# Patient Record
Sex: FEMALE | Race: WHITE | NOT HISPANIC OR LATINO | URBAN - METROPOLITAN AREA
[De-identification: names, ages, dates, MRNs, and addresses within clinical notes are randomized per-mention and may not be internally consistent; named-entity substitution may affect disease eponyms.]

---

## 2019-02-18 ENCOUNTER — EMERGENCY (EMERGENCY)
Facility: HOSPITAL | Age: 74
LOS: 1 days | Discharge: HOME | End: 2019-02-18
Attending: EMERGENCY MEDICINE | Admitting: EMERGENCY MEDICINE
Payer: MEDICARE

## 2019-02-18 VITALS
WEIGHT: 186.07 LBS | DIASTOLIC BLOOD PRESSURE: 89 MMHG | HEART RATE: 72 BPM | SYSTOLIC BLOOD PRESSURE: 169 MMHG | OXYGEN SATURATION: 98 % | RESPIRATION RATE: 18 BRPM | TEMPERATURE: 98 F

## 2019-02-18 LAB
ALBUMIN SERPL ELPH-MCNC: 3.7 G/DL — SIGNIFICANT CHANGE UP (ref 3.3–5)
ALP SERPL-CCNC: 112 U/L — SIGNIFICANT CHANGE UP (ref 40–120)
ALT FLD-CCNC: 18 U/L — SIGNIFICANT CHANGE UP (ref 12–78)
ANION GAP SERPL CALC-SCNC: 9 MMOL/L — SIGNIFICANT CHANGE UP (ref 5–17)
APTT BLD: 36.4 SEC — HIGH (ref 27.5–36.3)
AST SERPL-CCNC: 15 U/L — SIGNIFICANT CHANGE UP (ref 15–37)
BASOPHILS # BLD AUTO: 0.04 K/UL — SIGNIFICANT CHANGE UP (ref 0–0.2)
BASOPHILS NFR BLD AUTO: 0.5 % — SIGNIFICANT CHANGE UP (ref 0–2)
BILIRUB SERPL-MCNC: 0.4 MG/DL — SIGNIFICANT CHANGE UP (ref 0.2–1.2)
BUN SERPL-MCNC: 17 MG/DL — SIGNIFICANT CHANGE UP (ref 7–23)
CALCIUM SERPL-MCNC: 8.7 MG/DL — SIGNIFICANT CHANGE UP (ref 8.5–10.1)
CHLORIDE SERPL-SCNC: 108 MMOL/L — SIGNIFICANT CHANGE UP (ref 96–108)
CO2 SERPL-SCNC: 25 MMOL/L — SIGNIFICANT CHANGE UP (ref 22–31)
CREAT SERPL-MCNC: 0.9 MG/DL — SIGNIFICANT CHANGE UP (ref 0.5–1.3)
EOSINOPHIL # BLD AUTO: 0.22 K/UL — SIGNIFICANT CHANGE UP (ref 0–0.5)
EOSINOPHIL NFR BLD AUTO: 2.5 % — SIGNIFICANT CHANGE UP (ref 0–6)
GLUCOSE SERPL-MCNC: 104 MG/DL — HIGH (ref 70–99)
HCT VFR BLD CALC: 41.5 % — SIGNIFICANT CHANGE UP (ref 34.5–45)
HGB BLD-MCNC: 13.5 G/DL — SIGNIFICANT CHANGE UP (ref 11.5–15.5)
IMM GRANULOCYTES NFR BLD AUTO: 0.2 % — SIGNIFICANT CHANGE UP (ref 0–1.5)
INR BLD: 1.07 RATIO — SIGNIFICANT CHANGE UP (ref 0.88–1.16)
LYMPHOCYTES # BLD AUTO: 0.96 K/UL — LOW (ref 1–3.3)
LYMPHOCYTES # BLD AUTO: 10.9 % — LOW (ref 13–44)
MCHC RBC-ENTMCNC: 30.2 PG — SIGNIFICANT CHANGE UP (ref 27–34)
MCHC RBC-ENTMCNC: 32.5 GM/DL — SIGNIFICANT CHANGE UP (ref 32–36)
MCV RBC AUTO: 92.8 FL — SIGNIFICANT CHANGE UP (ref 80–100)
MONOCYTES # BLD AUTO: 0.65 K/UL — SIGNIFICANT CHANGE UP (ref 0–0.9)
MONOCYTES NFR BLD AUTO: 7.4 % — SIGNIFICANT CHANGE UP (ref 2–14)
NEUTROPHILS # BLD AUTO: 6.93 K/UL — SIGNIFICANT CHANGE UP (ref 1.8–7.4)
NEUTROPHILS NFR BLD AUTO: 78.5 % — HIGH (ref 43–77)
NRBC # BLD: 0 /100 WBCS — SIGNIFICANT CHANGE UP (ref 0–0)
PLATELET # BLD AUTO: 228 K/UL — SIGNIFICANT CHANGE UP (ref 150–400)
POTASSIUM SERPL-MCNC: 3.8 MMOL/L — SIGNIFICANT CHANGE UP (ref 3.5–5.3)
POTASSIUM SERPL-SCNC: 3.8 MMOL/L — SIGNIFICANT CHANGE UP (ref 3.5–5.3)
PROT SERPL-MCNC: 7.6 G/DL — SIGNIFICANT CHANGE UP (ref 6–8.3)
PROTHROM AB SERPL-ACNC: 12.1 SEC — SIGNIFICANT CHANGE UP (ref 10–12.9)
RBC # BLD: 4.47 M/UL — SIGNIFICANT CHANGE UP (ref 3.8–5.2)
RBC # FLD: 12.7 % — SIGNIFICANT CHANGE UP (ref 10.3–14.5)
SODIUM SERPL-SCNC: 142 MMOL/L — SIGNIFICANT CHANGE UP (ref 135–145)
TROPONIN I SERPL-MCNC: <.015 NG/ML — SIGNIFICANT CHANGE UP (ref 0.01–0.04)
WBC # BLD: 8.82 K/UL — SIGNIFICANT CHANGE UP (ref 3.8–10.5)
WBC # FLD AUTO: 8.82 K/UL — SIGNIFICANT CHANGE UP (ref 3.8–10.5)

## 2019-02-18 PROCEDURE — 71046 X-RAY EXAM CHEST 2 VIEWS: CPT | Mod: 26

## 2019-02-18 PROCEDURE — 70498 CT ANGIOGRAPHY NECK: CPT

## 2019-02-18 PROCEDURE — 85730 THROMBOPLASTIN TIME PARTIAL: CPT

## 2019-02-18 PROCEDURE — 85610 PROTHROMBIN TIME: CPT

## 2019-02-18 PROCEDURE — 70496 CT ANGIOGRAPHY HEAD: CPT | Mod: 26

## 2019-02-18 PROCEDURE — 93005 ELECTROCARDIOGRAM TRACING: CPT

## 2019-02-18 PROCEDURE — 80053 COMPREHEN METABOLIC PANEL: CPT

## 2019-02-18 PROCEDURE — 71046 X-RAY EXAM CHEST 2 VIEWS: CPT

## 2019-02-18 PROCEDURE — 99284 EMERGENCY DEPT VISIT MOD MDM: CPT | Mod: 25

## 2019-02-18 PROCEDURE — 36415 COLL VENOUS BLD VENIPUNCTURE: CPT

## 2019-02-18 PROCEDURE — 84484 ASSAY OF TROPONIN QUANT: CPT

## 2019-02-18 PROCEDURE — 70498 CT ANGIOGRAPHY NECK: CPT | Mod: 26

## 2019-02-18 PROCEDURE — 93010 ELECTROCARDIOGRAM REPORT: CPT

## 2019-02-18 PROCEDURE — 85027 COMPLETE CBC AUTOMATED: CPT

## 2019-02-18 PROCEDURE — 70496 CT ANGIOGRAPHY HEAD: CPT

## 2019-02-18 RX ORDER — LEVOTHYROXINE SODIUM 125 MCG
0 TABLET ORAL
Qty: 0 | Refills: 0 | COMMUNITY

## 2019-02-18 RX ORDER — ACEBUTOLOL HCL 200 MG
0 CAPSULE ORAL
Qty: 0 | Refills: 0 | COMMUNITY

## 2019-02-18 RX ORDER — MECLIZINE HCL 12.5 MG
25 TABLET ORAL ONCE
Qty: 0 | Refills: 0 | Status: COMPLETED | OUTPATIENT
Start: 2019-02-18 | End: 2019-02-18

## 2019-02-18 RX ORDER — MECLIZINE HCL 12.5 MG
1 TABLET ORAL
Qty: 9 | Refills: 0 | OUTPATIENT
Start: 2019-02-18 | End: 2019-02-20

## 2019-02-18 RX ORDER — SODIUM CHLORIDE 9 MG/ML
1000 INJECTION INTRAMUSCULAR; INTRAVENOUS; SUBCUTANEOUS ONCE
Qty: 0 | Refills: 0 | Status: COMPLETED | OUTPATIENT
Start: 2019-02-18 | End: 2019-02-18

## 2019-02-18 RX ADMIN — SODIUM CHLORIDE 500 MILLILITER(S): 9 INJECTION INTRAMUSCULAR; INTRAVENOUS; SUBCUTANEOUS at 08:44

## 2019-02-18 RX ADMIN — Medication 25 MILLIGRAM(S): at 08:43

## 2019-02-18 NOTE — ED PROVIDER NOTE - PROGRESS NOTE DETAILS
pt stable await ct, doing well, aware of prelim labs, reports she did have surgery for basal cell on chest wall remotely, hx of d and c remotely

## 2019-02-18 NOTE — ED PROVIDER NOTE - CHPI ED SYMPTOMS NEG
no vomiting/no change in level of consciousness/no chills/no fever/no loss of consciousness/no nausea/no numbness/no syncope/no weakness

## 2019-02-18 NOTE — ED PROVIDER NOTE - CARE PROVIDER_API CALL
Donn Zamarripa)  Internal Medicine; Rheumatology  [address] [place] [address]  Phone: [phone]  Fax: [phone]  Follow Up Time:     Hakeem Mccormack)  Neurology  [address] [place] [address]  Phone: [phone]  Fax: [phone]  Follow Up Time:

## 2019-02-18 NOTE — ED PROVIDER NOTE - RADIOLOGY FINDINGS
reviewed by me/CXR no acute abnormality reviewed by me/CT head - no acute findings/CXR no acute abnormality

## 2019-02-18 NOTE — ED PROVIDER NOTE - OBJECTIVE STATEMENT
pt is a 72 yo f whose pmd is dr calvin lee.  she reports feeling congested with sinus pressure on r side for 2 weeks associated with nasal stuffiness and intermittent blood when blowing nose. she started nasal saline and saw her pmd 7 days ago.  he adv pt to continue symptomatic therapies including starting a humidifier.  she stopped using the nasal saline. pmd ordered carotid dopplers but she is not scheduled for them until next week. due to insurance.  this am at 1 am she reports that she was watching tv and became very dizzy and had trouble focusing.  the dizziness was better if she closed her eyes.  She went to bed and on awakening this am she has improved sx but still has r maxillary sinus pressure a fullness in r ear and endorses that for a moment during her episodes of dizziness felt pins and needles on the left side of her face, arm and leg. x 1 episode for just a moment.  she has remote hx of vertigo 6 years ago resolved no further episodes.  no trauma no fever no chills no cp no sob bib  for eval  never smoker not a drinker intolerant of cipro causeing back pain. hx of palpitations and low thyroid

## 2019-02-18 NOTE — ED PROVIDER NOTE - NSFOLLOWUPINSTRUCTIONS_ED_ALL_ED_FT
follow up with dr Dallin RIVAS FOR DIZZYNESS EVERY 8 HOURS WITH CAUTION WILL MAKE YOU SLEEPY  ZOFRAN (IF PRESCRIBED) FOR NAUSEA AS NEEDED EVERY 8 HOURS  STAY WELL HYDRATED  FOLLOW UP WITH NEUROLOGY  FOLLOW UP WITH YOUR DOCTOR OR THE CLINIC   NO ALCOHOL OR SEDATIVES  DO NOT DRIVE IF YOU ARE DIZZY OR SYMPTOMATIC  RETURN FOR WORSENING SYMPTOMS OR ANY CONCERN

## 2019-02-18 NOTE — ED PROVIDER NOTE - ENMT, MLM
Airway patent, Nasal mucosa clear. Mouth with normal mucosa. Throat has no vesicles, no oropharyngeal exudates and uvula is midline. nares:r nares dried mucosa with evidence of prior nose bleed no active bleed no sinus pain to percussion, pt has dull r ear no effusion no erythema cw prior infections, l tm normal neck is supple no jordan

## 2019-10-14 NOTE — ED ADULT NURSE NOTE - NS ED NURSE LEVEL OF CONSCIOUSNESS SPEECH
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 Dr. Melanie Murillo filled out and signed form. Form faxed back to [phone].
Speaking Coherently

## 2020-07-16 NOTE — ED PROVIDER NOTE - NS_EDPROVIDERDISPOUSERTYPE_ED_A_ED
Vaccine Information Statement(s) was given today. This has been reviewed, questions answered, and verbal consent given by Patient for injection(s) and administration of Shingles.        Patient tolerated without incident. See immunization grid for documentation.         Attending Attestation (For Attendings USE Only)...

## 2022-11-01 NOTE — ED ADULT NURSE NOTE - GASTROINTESTINAL WDL
Abdomen soft, nontender, nondistended, bowel sounds present in all 4 quadrants. Azathioprine Pregnancy And Lactation Text: This medication is Pregnancy Category D and isn't considered safe during pregnancy. It is unknown if this medication is excreted in breast milk.

## 2024-07-28 ENCOUNTER — EMERGENCY (EMERGENCY)
Facility: HOSPITAL | Age: 79
LOS: 1 days | Discharge: HOME | End: 2024-07-28
Attending: EMERGENCY MEDICINE
Payer: MEDICARE

## 2024-07-28 VITALS
HEART RATE: 74 BPM | WEIGHT: 175.93 LBS | OXYGEN SATURATION: 97 % | SYSTOLIC BLOOD PRESSURE: 157 MMHG | RESPIRATION RATE: 20 BRPM | HEIGHT: 64 IN | DIASTOLIC BLOOD PRESSURE: 87 MMHG | TEMPERATURE: 98 F

## 2024-07-28 LAB
ALBUMIN SERPL ELPH-MCNC: 4.2 G/DL — SIGNIFICANT CHANGE UP (ref 3.3–5)
ALP SERPL-CCNC: 104 U/L — SIGNIFICANT CHANGE UP (ref 40–120)
ALT FLD-CCNC: 15 U/L — SIGNIFICANT CHANGE UP (ref 10–45)
ANION GAP SERPL CALC-SCNC: 15 MMOL/L — SIGNIFICANT CHANGE UP (ref 5–17)
APTT BLD: 30.8 SEC — SIGNIFICANT CHANGE UP (ref 24.5–35.6)
AST SERPL-CCNC: 24 U/L — SIGNIFICANT CHANGE UP (ref 10–40)
BASOPHILS # BLD AUTO: 0.03 K/UL — SIGNIFICANT CHANGE UP (ref 0–0.2)
BASOPHILS NFR BLD AUTO: 0.4 % — SIGNIFICANT CHANGE UP (ref 0–2)
BILIRUB SERPL-MCNC: 0.5 MG/DL — SIGNIFICANT CHANGE UP (ref 0.2–1.2)
BUN SERPL-MCNC: 12 MG/DL — SIGNIFICANT CHANGE UP (ref 7–23)
CALCIUM SERPL-MCNC: 10 MG/DL — SIGNIFICANT CHANGE UP (ref 8.4–10.5)
CHLORIDE SERPL-SCNC: 99 MMOL/L — SIGNIFICANT CHANGE UP (ref 96–108)
CO2 SERPL-SCNC: 21 MMOL/L — LOW (ref 22–31)
CREAT SERPL-MCNC: 0.88 MG/DL — SIGNIFICANT CHANGE UP (ref 0.5–1.3)
EGFR: 66 ML/MIN/1.73M2 — SIGNIFICANT CHANGE UP
EOSINOPHIL # BLD AUTO: 0.19 K/UL — SIGNIFICANT CHANGE UP (ref 0–0.5)
EOSINOPHIL NFR BLD AUTO: 2.3 % — SIGNIFICANT CHANGE UP (ref 0–6)
GLUCOSE SERPL-MCNC: 108 MG/DL — HIGH (ref 70–99)
HCT VFR BLD CALC: 37.8 % — SIGNIFICANT CHANGE UP (ref 34.5–45)
HGB BLD-MCNC: 12.5 G/DL — SIGNIFICANT CHANGE UP (ref 11.5–15.5)
IMM GRANULOCYTES NFR BLD AUTO: 0.1 % — SIGNIFICANT CHANGE UP (ref 0–0.9)
INR BLD: 1.09 RATIO — SIGNIFICANT CHANGE UP (ref 0.85–1.18)
LIDOCAIN IGE QN: 32 U/L — SIGNIFICANT CHANGE UP (ref 7–60)
LYMPHOCYTES # BLD AUTO: 1.28 K/UL — SIGNIFICANT CHANGE UP (ref 1–3.3)
LYMPHOCYTES # BLD AUTO: 15.5 % — SIGNIFICANT CHANGE UP (ref 13–44)
MCHC RBC-ENTMCNC: 31.1 PG — SIGNIFICANT CHANGE UP (ref 27–34)
MCHC RBC-ENTMCNC: 33.1 GM/DL — SIGNIFICANT CHANGE UP (ref 32–36)
MCV RBC AUTO: 94 FL — SIGNIFICANT CHANGE UP (ref 80–100)
MONOCYTES # BLD AUTO: 0.79 K/UL — SIGNIFICANT CHANGE UP (ref 0–0.9)
MONOCYTES NFR BLD AUTO: 9.6 % — SIGNIFICANT CHANGE UP (ref 2–14)
NEUTROPHILS # BLD AUTO: 5.96 K/UL — SIGNIFICANT CHANGE UP (ref 1.8–7.4)
NEUTROPHILS NFR BLD AUTO: 72.1 % — SIGNIFICANT CHANGE UP (ref 43–77)
NRBC # BLD: 0 /100 WBCS — SIGNIFICANT CHANGE UP (ref 0–0)
PLATELET # BLD AUTO: 193 K/UL — SIGNIFICANT CHANGE UP (ref 150–400)
POTASSIUM SERPL-MCNC: 3.8 MMOL/L — SIGNIFICANT CHANGE UP (ref 3.5–5.3)
POTASSIUM SERPL-SCNC: 3.8 MMOL/L — SIGNIFICANT CHANGE UP (ref 3.5–5.3)
PROT SERPL-MCNC: 7.4 G/DL — SIGNIFICANT CHANGE UP (ref 6–8.3)
PROTHROM AB SERPL-ACNC: 11.4 SEC — SIGNIFICANT CHANGE UP (ref 9.5–13)
RBC # BLD: 4.02 M/UL — SIGNIFICANT CHANGE UP (ref 3.8–5.2)
RBC # FLD: 12.4 % — SIGNIFICANT CHANGE UP (ref 10.3–14.5)
SODIUM SERPL-SCNC: 135 MMOL/L — SIGNIFICANT CHANGE UP (ref 135–145)
WBC # BLD: 8.26 K/UL — SIGNIFICANT CHANGE UP (ref 3.8–10.5)
WBC # FLD AUTO: 8.26 K/UL — SIGNIFICANT CHANGE UP (ref 3.8–10.5)

## 2024-07-28 PROCEDURE — 99285 EMERGENCY DEPT VISIT HI MDM: CPT

## 2024-07-28 PROCEDURE — 82962 GLUCOSE BLOOD TEST: CPT

## 2024-07-28 PROCEDURE — 85730 THROMBOPLASTIN TIME PARTIAL: CPT

## 2024-07-28 PROCEDURE — 71046 X-RAY EXAM CHEST 2 VIEWS: CPT | Mod: 26

## 2024-07-28 PROCEDURE — 99285 EMERGENCY DEPT VISIT HI MDM: CPT | Mod: 25

## 2024-07-28 PROCEDURE — 85610 PROTHROMBIN TIME: CPT

## 2024-07-28 PROCEDURE — 85025 COMPLETE CBC W/AUTO DIFF WBC: CPT

## 2024-07-28 PROCEDURE — 83690 ASSAY OF LIPASE: CPT

## 2024-07-28 PROCEDURE — 93005 ELECTROCARDIOGRAM TRACING: CPT

## 2024-07-28 PROCEDURE — 80053 COMPREHEN METABOLIC PANEL: CPT

## 2024-07-28 PROCEDURE — 71046 X-RAY EXAM CHEST 2 VIEWS: CPT

## 2024-07-28 RX ORDER — LIDOCAINE HCL 28 MG/G
1 GEL TOPICAL ONCE
Refills: 0 | Status: COMPLETED | OUTPATIENT
Start: 2024-07-28 | End: 2024-07-28

## 2024-07-28 RX ADMIN — LIDOCAINE HCL 1 PATCH: 28 GEL TOPICAL at 22:03

## 2024-07-28 NOTE — ED ADULT NURSE NOTE - NSFALLRISKINTERV_ED_ALL_ED

## 2024-07-28 NOTE — ED PROVIDER NOTE - PHYSICAL EXAMINATION
GENERAL: well appearing in no acute distress  HEAD: normocephalic, atraumatic  HEENT: normal conjunctiva, oral mucosa moist  CARDIAC: regular rate and rhythm, no appreciable murmurs,  PULM: normal breath sounds, clear to ascultation bilaterally, no rales, rhonchi, wheezing  GI: abdomen nondistended, soft, nontender, no guarding, rebound tenderness  NEURO: no focal motor or sensory deficits, CN2-12 intact, normal speech, PERRLA, EOMI,  AAOx3  MSK: midline ttp in the mid thoracic spine, no midline cervical ttp, right posterior chest wall tenderness, no extremity tenderness, ROM of UE and LE intact b/l   SKIN: well-perfused, extremities warm, no visible rashes  PSYCH: appropriate mood and affect

## 2024-07-28 NOTE — ED ADULT TRIAGE NOTE - CHIEF COMPLAINT QUOTE
mechanical fall yesterday was seen at  who dx with rib fx, sent to [facility] that night who did CT and shown to have traumatic pneumothorax   denies any SOB

## 2024-07-28 NOTE — ED ADULT NURSE REASSESSMENT NOTE - NS ED NURSE REASSESS COMMENT FT1
pt educated regarding use of incentive spirometer. Pt able to demonstrate understanding via teachback.

## 2024-07-28 NOTE — ED PROVIDER NOTE - CARE PLAN
1 Principal Discharge DX:	Fracture of single rib  Secondary Diagnosis:	Fracture of thoracic transverse process

## 2024-07-28 NOTE — ED PROVIDER NOTE - NSFOLLOWUPINSTRUCTIONS_ED_ALL_ED_FT
you were seen Emergency room for a rib fracture of your right posterior seventh rib, T7 transverse process fracture, and a very tiny pneumothorax.  Upon repeat chest x-ray performed at our institution, there is no evidence of pneumothorax.  We had trauma evaluate you and they said you are okay for discharge home with outpatient follow-up.    Please follow-up with your primary care physician this week.  Please also see a spine specialist for your back.    Please return to the emergency room if you experience any new or worsening symptoms.  We sent a prescription for lidocaine patches to your pharmacy.  Please use these as directed.    For pain you may take    -- Please use 1,000mg Tylenol (also called acetaminophen) every 6 hours & 400mg Motrin (also called Advil or ibuprofen) every 6 hours as needed for pain/discomfort/swelling. You can get these without a prescription. Don't use more than 3500mg of Tylenol in any 24-hour period. Make sure your other prescription/over-the-counter medications don't contain any Tylenol so you don't take too much. If you have any stomach discomfort while taking Motrin, you can use TUMS or Pepcid or Zantac (these can all be bought without a prescription).      Use the incentive spirometer multiple times a day to keep your lungs open and aerated      If not improved in 2 weeks, follow with either:     [facility] (comprehensive, with Neurology, Orthopedic Surgery, Neurosurgery, and Physical Therapy): [phone].

## 2024-07-28 NOTE — ED PROVIDER NOTE - PROGRESS NOTE DETAILS
Seen by trauma who signed off. Dispo pending re-eval, po challenge. Received CT report from [facility] via fax. Shows Mildly displaced fracture through the right posterior seventh rib.  Probably nondisplaced fracture through the right transverse process of T7.  Tiny right apical pneumothorax.  Trace right pleural effusion.  Few scattered pulmonary micronodules measuring up to 0.2 cm no routine follow-up imaging is recommended. Michelle Castellanos MD, PGY3: Patient reassessed at bedside, reports improvement in symptoms at this time.  pulling 1750 on IS. All findings on labs and imaging communicated with patient, all questions answered at this time.  Patient given strict return precautions and directions to follow-up with their primary care physician and spine within the next week.  Patient verbalized understanding and agrees with plan.  Patient ready for discharge.

## 2024-07-28 NOTE — ED PROVIDER NOTE - ATTENDING CONTRIBUTION TO CARE
Attending MD Kennedy:  I have seen and examined this patient and fully participated in the care of this patient as the teaching attending. I personally made/approved the management plan and take responsibility for the patient management.      79-year-old woman is presenting for evaluation of right-sided back and chest wall pain in the setting of reportedly diagnosed rib fractures on the right side with possible pneumothorax.  The patient states that last night she tripped and fell onto her right side, she did not hit her head or lose consciousness.  She was seen at urgent care and had a chest x-ray that raise concern for rib fracture, sent to [facility] emergency department and had a CTA scan of the chest that they report had a traumatic pneumothorax and transverse process fracture of a thoracic vertebrae, patient's disc was sent with her however there is no report from that hospital.  Patient was recommended to be sent to [facility] for observation and trauma evaluation however she preferred to come to this hospital for second opinion.  She has not taken any pain medication since earlier today.  She has no shortness of breath.  Does not take any anticoagulants.    Patient's vital signs are nonactionable oxygen saturation is 96 to 97% in room air, she is breathing comfortably on room air.  Breath sounds are full and equal in the bilateral anterior lung fields.  There is very mild tenderness to palpation in the right posterolateral chest wall without crepitus.  The abdomen is soft nondistended nontender.  The cervical spine is nontender.  There is no midline thoracic tenderness to palpation.  Moves all extremities spontaneously. Symmetric  strength bilateral upper extremities, elbow flexion 5/5 bilaterally, elbow extension 5/5 bilaterally, patient can straight leg raise bilaterally and resist symmetrically. Symmetric smile, EOMI b/l extremities warm well-perfused pulses palpable.    Patient is presenting for evaluation of fall from standing yesterday with reported chest wall injury right-sided rib fracture and reported pneumothorax.  Patient fortunately at this time is clinically well-appearing and in no respiratory distress.  Will attempt to upload CT images from outside hospital chest CT, in the interim will obtain repeat chest x-ray to ensure no very large pneumothorax, placed on continuous pulse oximetry, obtain screening labs incentive spirometry and anticipate trauma surgery consultation once imaging obtained      *The above represents an initial assessment/impression. Please refer to progress notes for potential changes in patient clinical course*

## 2024-07-28 NOTE — ED PROVIDER NOTE - OTHER FINDINGS
ECG recorded at 9:51 PM independently interpreted by me , Dr Jared Kennedy,  at 11:58 PM shows normal sinus rhythm left axis deviation normal intervals no acute diagnostic ischemic ST-T changes

## 2024-07-28 NOTE — ED ADULT NURSE NOTE - CHIEF COMPLAINT QUOTE
mechanical fall yesterday was seen at  who dx with rib fx, sent to [facility] that night who did CT and shown to have traumatic pneumothorax   denies any SOB

## 2024-07-28 NOTE — ED ADULT NURSE NOTE - OBJECTIVE STATEMENT
PT is an 79 y.o female co rib injury. Pt is A&Ox4 accompanied by son at bedside. PT presents s/p mechanical fall last night while fixing her radio. Pt was seen at  earlier today where she was told she had RT sided rib fx and advised to go to nearby hospital for CT scan. At OSH pt was told she had a "traumatic  pneumothorax" but pt wanted to come to [facility] for second opinion. On exam, pt spontaneously breathing on RA>95 %, CM NSR, RT chest TTP, skin dry and intact, peripheral pulses present, PERRLA. Pt denies any SOB, head strike, dizziness, weakness, fever, N/V/D, chills, change in vision, or dysuria. Safety and comfort measures initiated- bed placed in lowest position and side rails raised. Pt oriented to call bell system.

## 2024-07-28 NOTE — ED ADULT NURSE REASSESSMENT NOTE - NS ED NURSE REASSESS COMMENT FT1
[facility] room tele tech contacted regarding continuous pulse ox order. RN to be notified if SpO2 <90%.

## 2024-07-28 NOTE — ED PROVIDER NOTE - OBJECTIVE STATEMENT
79-year-old female history of heart palpitations, hypertension and hypothyroidism presents for evaluation of a right posterior seventh rib fracture, right transverse process fracture of T7, and tiny right apical pneumothorax with a trace right pleural effusion found on CAT scan at an outside hospital after patient had trip and fall at home.  Patient states she was bending over to plug in her portable radio when she lost her balance and toppled onto her right side.  Patient denies hitting her head or using AC.  Patient denies LOC.  Patient had gone to an urgent care where x-ray at that time showed concerning findings and she was transferred over to [facility] where CT showed the above injuries.  Patient at this time is denying any chest pain difficulty breathing headache vision changes abdominal pain nausea vomiting pain in her extremities.  Patient states her pain is controlled at this time.

## 2024-07-28 NOTE — ED PROVIDER NOTE - PATIENT PORTAL LINK FT
You can access the FollowMyHealth Patient Portal offered by [facility] by registering at the following website: [email or web address]. By joining [facility]’s FollowMyHealth portal, you will also be able to view your health information using other applications (apps) compatible with our system.

## 2024-07-28 NOTE — ED PROVIDER NOTE - CLINICAL SUMMARY MEDICAL DECISION MAKING FREE TEXT BOX
79-year-old female presenting for trauma evaluation of multiple traumatic injuries after mechanical fall, reviewed outside Hospital CAT scan report, patient has mildly displaced fracture through the right posterior seventh rib probable a nondisplaced fracture through the right transverse process fracture of T7.  Tiny right apical pneumothorax trace right pleural effusion.  Patient otherwise is well-appearing at this time with no hemodynamic abnormalities that would require intervention at this time, pulling 750 on incentive spirometer.  Pain is well-controlled.    Plan for chest x-ray, labs and trauma consult.  Dispo pending findings.

## 2024-07-28 NOTE — ED PROVIDER NOTE - CARE PROVIDER_API CALL
Mitzi Ortiz Usman  Neurosurgery  [address], [address]  [place] [address]  Phone: [phone]  Fax: [phone]  Follow Up Time: 4-6 Days

## 2024-07-29 VITALS
RESPIRATION RATE: 18 BRPM | OXYGEN SATURATION: 100 % | SYSTOLIC BLOOD PRESSURE: 135 MMHG | TEMPERATURE: 98 F | DIASTOLIC BLOOD PRESSURE: 81 MMHG | HEART RATE: 74 BPM

## 2024-07-29 RX ORDER — LIDOCAINE HCL 28 MG/G
1 GEL TOPICAL
Qty: 4 | Refills: 0
Start: 2024-07-29 | End: 2024-08-07

## 2024-07-29 NOTE — CONSULT NOTE ADULT - SUBJECTIVE AND OBJECTIVE BOX
TRAUMA SURGERY CONSULT NOTE  --------------------------------------------------------------------------------------------    TRAUMA ACTIVATION LEVEL: Consult    MECHANISM OF INJURY:      [x] Blunt  	[] MVC	[x] Fall	[] Pedestrian Struck	[] Motorcycle accident      [] Penetrating  	[] Gun Shot Wound 		[] Stab Wound    GCS: 15 	E: 4	V: 5	M: 6    HPI:   78yo very pleasant F with Hx HTN, hypothyroidism, and arrythmia who presents after mechanical fall earlier this evening with (-) HS / (-) LOC. Patient states that she bent over to plug in her portable radio when she tripped and fell over. Patient denies syncope prior to fall. She was seen at urgent care with CXR c/f rib Fx, for which she was sent to [facility] ED. She was found to have a traumatic apical PTX and a transverse process Fx and was transferred to [facility] for Trauma evaluation. On presentation to [facility], primary survey was intact with GCS 15. Secondary survey significant for point tenderness to R posterior chest wall. CXR at [facility] negative for PTX. Patient pulling 1500 on ISS.     Primary Survey:  A - airway intact  B - bilateral breath sounds and good chest rise  C - initial BP  BP: 152/81 (07-28-24 @ 21:40), HR HR: 73 (07-28-24 @ 21:40), palpable pulses in all extremities  D - GCS 15 on arrival  Exposure obtained    Secondary Survey:  GEN: resting comfortably in bed, in NAD  HEENT: normocephalic, non-tender to palpation, no abrasions visible, no step-offs palpated  NECK: trachea midline, non-tender to palpation at posterior midline, no pain with flexion, extension, and bilateral neck rotation  CHEST: non-tender to palpation across clavicles and b/l anterior ribs  BACK: non-tender to palpation along cervical, thoracic, lumbar spine midline, tender to palpation R posterior chest wall, no palpable step-offs or hematomas  ABD: soft, non-distended, non-tender   PELVIS: no pelvic instability  RECTUM: good rectal tone  LUE: no visible abrasions or deformities, non-tender to palpation across upper and lower arm, 5/5  strength, fingers warm, well-perfused, full ROM in shoulder, elbow, wrist, and fingers, palpable radial + ulnar pulses  RUE: no visible abrasions or deformities, non-tender to palpation across upper and lower arm, 5/5  strength, fingers warm, well-perfused, full ROM in shoulder, elbow, wrist, and fingers, palpable radial + ulnar pulses  LLE: no visible abrasions or deformities, non-tender to palpation across upper and lower leg; full ROM in hip, knee, ankle, and toes, 5/5 dorsiflexion + plantarflexion, palpable DP + PT pulses; warm, well-perfused  RLE: no visible abrasions or deformities, non-tender to palpation across upper and lower leg; full ROM in hip, knee, ankle, and toes, 5/5 dorsiflexion + plantarflexion, palpable DP + PT pulses; warm, well-perfused  NEURO: AAOx4, no focal neuro deficits; CN II-IX intact; pupils 3mm, equal and reactive to light bilaterally    ROS: 10-system review is otherwise negative except HPI above.      ALLERGIES:  Cipro (Other)      HOME MEDICATIONS:      CURRENT MEDICATIONS  MEDICATIONS (STANDING):   MEDICATIONS (PRN):  --------------------------------------------------------------------------------------------    Vitals:  T(C): 36.5 (07-28-24 @ 21:40), Max: 36.6 (07-28-24 @ 21:11)  HR: 73 (07-28-24 @ 21:40) (73 - 74)  BP: 152/81 (07-28-24 @ 21:40) (152/81 - 157/87)  RR: 18 (07-28-24 @ 21:40) (18 - 20)  SpO2: 96% (07-28-24 @ 21:40) (96% - 97%)  CAPILLARY BLOOD GLUCOSE    POCT Blood Glucose.: 104 mg/dL (28 Jul 2024 21:51)    Height (cm): 162.6 (07-28 @ 21:11)  Weight (kg): 79.8 (07-28 @ 21:11)  BMI (kg/m2): 30.2 (07-28 @ 21:11)  BSA (m2): 1.85 (07-28 @ 21:11)    --------------------------------------------------------------------------------------------    Labs:  CBC (07-28 @ 21:57)                              12.5                           8.26    )----------------(  193        72.1  % Neutrophils, 15.5  % Lymphocytes, ANC: 5.96                                37.8      BMP (07-28 @ 21:57)             135     |  99      |  12    		Ca++ --      Ca 10.0               ---------------------------------( 108<H>		Mg --                 3.8     |  21<L>   |  0.88  			Ph --        LFTs (07-28 @ 21:57)      TPro 7.4 / Alb 4.2 / TBili 0.5 / DBili -- / AST 24 / ALT 15 / AlkPhos 104    Coags (07-28 @ 21:57)  aPTT 30.8 / INR 1.09 / PT 11.4    --------------------------------------------------------------------------------------------    Microbiology:  Urinalysis (07-28 @ 21:57):     Color:  / Appearance:  / SG:  / pH:  / Gluc: 108<H> / Ketones:  / Bili:  / Urobili:  / Protein : / Nitrites:  / Leuk.Est:  / RBC:  / WBC:  / Sq Epi:  / Non Sq Epi:  / Bacteria        --------------------------------------------------------------------------------------------    Imaging:  < from: Xray Chest 2 Views PA/Lat (07.28.24 @ 22:14) >  FINDINGS:  The lungs are clear.  There is no pleural effusion or pneumothorax.  The heart is normal insize  The visualized osseous structures demonstrate no acute pathology.    IMPRESSION:  No focal consolidations.    < end of copied text >      --------------------------------------------------------------------------------------------

## 2024-07-29 NOTE — CONSULT NOTE ADULT - ASSESSMENT
78yo very pleasant F with Hx HTN, hypothyroidism, and arrythmia who presents after mechanical fall earlier this evening with (-) HS / (-) LOC. Primary survey was intact with GCS 15. Secondary survey significant for point tenderness to R posterior chest wall.     Known Injuries:   - R apical PTX seen at OSH -- no PTX seen on rpt CXR at [facility]  - R posterior 7th mildly displaced rib Fx  - T7 non-displaced transverse process Fx     PLAN:   - No acute traumatic injury at this time  - OP f/u with Spine for T7 Fx  - Multimodal pain control for rib Fx  - Importance of ISS and ambulation discussed with patient for PNA prophylaxis   - No contraindications to discharge from Trauma perspective if patient is tolerating PO    Discussed with Trauma fellow, Dr. Manjarrez, on behalf of Dr. Sam Guerra, PGY-3  ACS | Trauma Surgery  #[id number]  80yo very pleasant F with Hx HTN, hypothyroidism, and arrythmia who presents after mechanical fall earlier this evening with (-) HS / (-) LOC. Primary survey was intact with GCS 15. Secondary survey significant for point tenderness to R posterior chest wall.     Known Injuries:   - R apical PTX seen at OSH -- no PTX seen on rpt CXR at [facility]  - R posterior 7th mildly displaced rib Fx  - T7 non-displaced transverse process Fx     PLAN:   - No acute trauma surgery intervention at this time  - OP f/u with Spine for T7 Fx  - Multimodal pain control for rib Fx  - Importance of ISS and ambulation discussed with patient for PNA prophylaxis   - No contraindications to discharge from Trauma perspective if patient is tolerating PO    Discussed with Trauma fellow, Dr. Manjarrez, on behalf of Dr. Sam Guerra, PGY-3  ACS | Trauma Surgery  #[id number]

## 2024-07-31 PROBLEM — E05.90 THYROTOXICOSIS, UNSPECIFIED WITHOUT THYROTOXIC CRISIS OR STORM: Chronic | Status: ACTIVE | Noted: 2019-02-18

## 2024-07-31 NOTE — ED PROVIDER NOTE - CPE EDP SKIN NORM
07/31/24 1338   Service Assessment   Patient Orientation Alert and Oriented   Cognition Alert   History Provided By Medical Record   Primary Caregiver Self   Accompanied By/Relationship alone in room   Support Systems Children   Patient's Healthcare Decision Maker is: Legal Next of Kin   PCP Verified by CM Yes   Last Visit to PCP Within last 6 months   Prior Functional Level Independent in ADLs/IADLs   Current Functional Level Independent in ADLs/IADLs   Can patient return to prior living arrangement Yes   Ability to make needs known: Good   Family able to assist with home care needs: Yes   Would you like for me to discuss the discharge plan with any other family members/significant others, and if so, who? No   Financial Resources Medicare;Medicaid   Community Resources None   CM/SW Referral Disease Management Education       
normal...

## 2024-08-18 ENCOUNTER — NON-APPOINTMENT (OUTPATIENT)
Age: 79
End: 2024-08-18

## 2024-08-19 ENCOUNTER — OUTPATIENT (OUTPATIENT)
Dept: OUTPATIENT SERVICES | Facility: HOSPITAL | Age: 79
LOS: 1 days | End: 2024-08-19
Payer: MEDICARE

## 2024-08-19 ENCOUNTER — APPOINTMENT (OUTPATIENT)
Dept: RADIOLOGY | Facility: IMAGING CENTER | Age: 79
End: 2024-08-19
Payer: MEDICARE

## 2024-08-19 ENCOUNTER — APPOINTMENT (OUTPATIENT)
Dept: NEUROSURGERY | Facility: CLINIC | Age: 79
End: 2024-08-19
Payer: MEDICARE

## 2024-08-19 VITALS
BODY MASS INDEX: 29.53 KG/M2 | WEIGHT: 173 LBS | HEART RATE: 80 BPM | TEMPERATURE: 97.9 F | OXYGEN SATURATION: 95 % | SYSTOLIC BLOOD PRESSURE: 159 MMHG | DIASTOLIC BLOOD PRESSURE: 84 MMHG | RESPIRATION RATE: 16 BRPM | HEIGHT: 64 IN

## 2024-08-19 DIAGNOSIS — E03.9 HYPOTHYROIDISM, UNSPECIFIED: ICD-10-CM

## 2024-08-19 DIAGNOSIS — S22.41XD MULTIPLE FRACTURES OF RIBS, RIGHT SIDE, SUBSEQUENT ENCOUNTER FOR FRACTURE WITH ROUTINE HEALING: ICD-10-CM

## 2024-08-19 DIAGNOSIS — S22.49XD MULTIPLE FRACTURES OF RIBS, UNSPECIFIED SIDE, SUBSEQUENT ENCOUNTER FOR FRACTURE WITH ROUTINE HEALING: ICD-10-CM

## 2024-08-19 DIAGNOSIS — Z86.79 PERSONAL HISTORY OF OTHER DISEASES OF THE CIRCULATORY SYSTEM: ICD-10-CM

## 2024-08-19 DIAGNOSIS — I10 ESSENTIAL (PRIMARY) HYPERTENSION: ICD-10-CM

## 2024-08-19 DIAGNOSIS — Z82.49 FAMILY HISTORY OF ISCHEMIC HEART DISEASE AND OTHER DISEASES OF THE CIRCULATORY SYSTEM: ICD-10-CM

## 2024-08-19 DIAGNOSIS — Z86.39 PERSONAL HISTORY OF OTHER ENDOCRINE, NUTRITIONAL AND METABOLIC DISEASE: ICD-10-CM

## 2024-08-19 PROBLEM — R00.2 PALPITATIONS: Chronic | Status: ACTIVE | Noted: 2024-07-29

## 2024-08-19 PROCEDURE — 71046 X-RAY EXAM CHEST 2 VIEWS: CPT | Mod: 26

## 2024-08-19 PROCEDURE — 99202 OFFICE O/P NEW SF 15 MIN: CPT

## 2024-08-19 PROCEDURE — 71046 X-RAY EXAM CHEST 2 VIEWS: CPT

## 2024-08-19 RX ORDER — ACEBUTOLOL HYDROCHLORIDE 400 MG/1
400 CAPSULE ORAL
Refills: 0 | Status: ACTIVE | COMMUNITY

## 2024-08-25 RX ORDER — LEVOTHYROXINE SODIUM 0.05 MG/1
50 TABLET ORAL
Refills: 0 | Status: ACTIVE | COMMUNITY

## 2024-08-25 NOTE — ASSESSMENT
[FreeTextEntry1] : Ms. CHETAN CAMPA is a 79 year- old- woman who presents with a diagnosis of Thoracic Fracture T7 of the right transevrse process in the setting of normal neurological exam     Imaging and diagnostic workup evaluation show evidence of T7 Fracture   Plan: -Repeat Chest Xray to evaluate T7 Fracture _ we will contact with the results   Follow up: -No additional follow up needed.     Please see Dr. Ortiz's dictation for details. I, Dr. Sung Ortiz evaluated the patient with the nurse practitioner Jean Claude Godinez and established the plan of care. I discussed this patient with the nurse practitioner during the visit. I agree with the assessment and plan as written unless noted below.

## 2024-08-25 NOTE — ASSESSMENT
[FreeTextEntry1] : Ms. CHETAN ACMPA is a 79 year- old- woman who presents with a diagnosis of Thoracic Fracture T7 of the right transevrse process in the setting of normal neurological exam     Imaging and diagnostic workup evaluation show evidence of T7 Fracture   Plan: -Repeat Chest Xray to evaluate T7 Fracture _ we will contact with the results   Follow up: -No additional follow up needed.     Please see Dr. Ortiz's dictation for details. I, Dr. Sung Ortiz evaluated the patient with the nurse practitioner Jean Claude Godinez and established the plan of care. I discussed this patient with the nurse practitioner during the visit. I agree with the assessment and plan as written unless noted below.

## 2024-08-25 NOTE — PHYSICAL EXAM
[General Appearance - Alert] : alert [General Appearance - In No Acute Distress] : in no acute distress [Oriented To Time, Place, And Person] : oriented to person, place, and time [Impaired Insight] : insight and judgment were intact [Sensation Tactile Decrease] : light touch was intact [Balance] : balance was intact [No Visual Abnormalities] : no visible abnormalities [Sclera] : the sclera and conjunctiva were normal [] : no respiratory distress [Heart Rate And Rhythm] : heart rate was normal and rhythm regular [Abnormal Walk] : normal gait

## 2024-08-25 NOTE — HISTORY OF PRESENT ILLNESS
[< 3 months] : less than 3 months [FreeTextEntry1] : Cervical fracture evaluation [de-identified] : Ms. Lizabeth Lott is an 79-year-old woman with a medical history of thyroid disorder, arrhythmia, and hypertension. She is seeking a comprehensive neurosurgical evaluation for a thoracic fracture following a fall. The patient reports that her symptoms began after a recent fall at home, which was a result of chronic balance difficulties. She does not have any upper extremity weakness or neck pain. She received evaluation and treatment in the emergency department, including cervical x-rays that showed evidence of a right transverse process thoracic fracture. She denies experiencing any neurological deficits.

## 2024-08-25 NOTE — HISTORY OF PRESENT ILLNESS
[< 3 months] : less than 3 months [FreeTextEntry1] : Cervical fracture evaluation [de-identified] : Ms. Lizabeth Lott is an 79-year-old woman with a medical history of thyroid disorder, arrhythmia, and hypertension. She is seeking a comprehensive neurosurgical evaluation for a thoracic fracture following a fall. The patient reports that her symptoms began after a recent fall at home, which was a result of chronic balance difficulties. She does not have any upper extremity weakness or neck pain. She received evaluation and treatment in the emergency department, including cervical x-rays that showed evidence of a right transverse process thoracic fracture. She denies experiencing any neurological deficits.

## 2025-07-14 ENCOUNTER — APPOINTMENT (OUTPATIENT)
Dept: ORTHOPEDIC SURGERY | Facility: CLINIC | Age: 80
End: 2025-07-14
Payer: MEDICARE

## 2025-07-14 VITALS — BODY MASS INDEX: 29.53 KG/M2 | HEIGHT: 64 IN | WEIGHT: 173 LBS

## 2025-07-14 PROCEDURE — 73562 X-RAY EXAM OF KNEE 3: CPT | Mod: LT

## 2025-07-14 PROCEDURE — 73110 X-RAY EXAM OF WRIST: CPT | Mod: RT

## 2025-07-14 PROCEDURE — 99203 OFFICE O/P NEW LOW 30 MIN: CPT | Mod: 25

## 2025-07-14 PROCEDURE — 20610 DRAIN/INJ JOINT/BURSA W/O US: CPT | Mod: LT
